# Patient Record
Sex: MALE | NOT HISPANIC OR LATINO | URBAN - METROPOLITAN AREA
[De-identification: names, ages, dates, MRNs, and addresses within clinical notes are randomized per-mention and may not be internally consistent; named-entity substitution may affect disease eponyms.]

---

## 2018-03-06 NOTE — PROCEDURE NOTE: CLINICAL
PROCEDURE NOTE: Removal of Corneal Epithelium OD. Diagnosis: Anterior Basement Membrane Corneal Dystrophy. Prior to treatment, the risks/benefits/alternatives were discussed. The patient wished to proceed with procedure. Corneal epithelium was removed. Patient tolerated procedure well. There were no complications. Post procedure instructions given. Yelena Castle PROCEDURE NOTE: Bandage Contact Lens OD. Diagnosis: Anterior Basement Membrane Corneal Dystrophy.

## 2018-03-06 NOTE — PATIENT DISCUSSION
Patient educated on Superficial Keratectomy is performed prior to IOL surgery, the patient will be scheduled for a 2-3 week FU/Decision For Surgery with surgeon to repeat AR, MR, GHAZALA, Orbscan, and Biometry. A final repeat AR, MR, Orbscan, and Biometry will be performed AM of Surgery to confirm stability of cornea.

## 2018-03-06 NOTE — PATIENT DISCUSSION
Written RX for Tylenol #3 with Codeine, disp. 10 tablets, q4hrs/prn, no refills. Patient educated about the possible ocular discomfort for the next few days and to use the pain medication as needed. Written RX for topical antibiotic, Vigamox, qid x 7 days.

## 2018-04-30 NOTE — PATIENT DISCUSSION
4 day PO: Patient is doing well post-operatively. The importance of post-op drop compliance was emphasized. Drop schedule reviewed with patient. Patient to call if any visual changes or concerns.

## 2018-05-24 NOTE — PATIENT DISCUSSION
Va sc should improve with time and patient will report if it decreases. Re-check refractive correction in 1 month.

## 2018-09-19 NOTE — PATIENT DISCUSSION
"co ""My right eye never came out as clear as my left after the surgery and was hoping for better distance.""."

## 2018-10-01 NOTE — PROCEDURE NOTE: CLINICAL
PROCEDURE NOTE: Punctal Plugs, Silicone #1 OD. Diagnosis: Dry Eye Syndrome. Anesthesia: Topical. Prep: Betadine Flush. Prior to treatment, the risks/benefits/alternatives were discussed. The patient wished to proceed with procedure. Permanent silicone plugs were inserted. Size/location of plugs inserted: *. Patient tolerated procedure well. There were no complications. Post procedure instructions given. RLL P. Plug  eagle va 0.5 No complications.

## 2018-10-31 NOTE — PROCEDURE NOTE: SURGICAL
<p>Prior to commencing surgery patient identification, surgical procedure, site, and side were confirmed by Dr. Adarsh Scherer. Following topical proparacaine anesthesia, the patient was positioned at the YAG laser, a contact lens coupled to the cornea with methylcellulose and an axial posterior capsulotomy performed without complication using 3.4 Mj x 22. Excess methylcellulose was washed from the eye, one drop of Alphagan was instilled and the patient returned to the holding area having tolerated the procedure well and without complication. </p><p>MRN:[id number]</p>

## 2018-10-31 NOTE — PATIENT DISCUSSION
If > -0.75 cyl persists after YAG OD, and pt still unhappy with [facility], OK to see [address] to schedule Epi-LASEK OD Goal: sandy

## 2021-02-09 NOTE — PATIENT DISCUSSION
If > -0.75 cyl persists after YAG OD, and pt still unhappy with [facility], OK to see [facility] to schedule Epi-LASEK OD Goal: sandy

## 2021-03-26 ENCOUNTER — IMPORTED ENCOUNTER (OUTPATIENT)
Dept: URBAN - METROPOLITAN AREA CLINIC 1 | Facility: CLINIC | Age: 81
End: 2021-03-26

## 2021-03-26 PROBLEM — H35.3124: Noted: 2021-03-26

## 2021-03-26 PROBLEM — H16.143: Noted: 2021-03-26

## 2021-03-26 PROBLEM — H35.3221: Noted: 2021-03-26

## 2021-03-26 PROBLEM — H04.123: Noted: 2021-03-26

## 2021-03-26 PROBLEM — Z96.1: Noted: 2021-03-26

## 2021-03-26 PROBLEM — H35.3111: Noted: 2021-03-26

## 2021-03-26 PROCEDURE — 99204 OFFICE O/P NEW MOD 45 MIN: CPT

## 2021-03-26 PROCEDURE — 92015 DETERMINE REFRACTIVE STATE: CPT

## 2021-05-19 NOTE — PROCEDURE NOTE: SURGICAL
<p>Prior to commencing surgery patient identification, surgical procedure, site, and side were confirmed by Dr. Chuck Monroe. Following topical proparacaine anesthesia, the patient was positioned at the YAG laser, a contact lens coupled to the cornea with methylcellulose and an axial posterior capsulotomy performed without complication using 2.8 Mj x 61. Excess methylcellulose was washed from the eye, one drop of Alphagan was instilled and the patient returned to the holding area having tolerated the procedure well and without complication. </p><p>MRN: [id number]</p><p>&nbsp;</p>

## 2022-02-09 NOTE — PATIENT DISCUSSION
If > -0.75 cyl persists after YAG OD, and pt still unhappy with [facility], OK to see [facility] to schedule Epi-LASEK OD Goal: sandy

## 2022-04-02 ASSESSMENT — TONOMETRY
OD_IOP_MMHG: 8
OS_IOP_MMHG: 18

## 2022-04-02 ASSESSMENT — VISUAL ACUITY
OD_CC: 20/30-2
OS_CC: 20/400

## 2023-08-23 NOTE — PATIENT DISCUSSION
Call immediately to report any decreased vision or visual distortion.
Healing well. continue antibiotics.
Monitor.
Patient understands there is an increased risk of corneal edema after cataract surgery.
Recommended artificial tears to use: 1 drop 4x a day in both eyes.
Recommended observation.
Retinal tear and detachment warning symptoms reviewed and patient instructed to call immediately if increasing floaters, flashes, or decreasing peripheral vision.
See #1&2.
See #1. Patient to return 2-3 weeks for follow up.
Otc Regimen: I recommended a broad spectrum sunscreen with a SPF of 30 or higher.  I explained that SPF 30 sunscreens block approximately 97 percent of the sun's harmful rays.  Sunscreens should be applied at least 15 minutes prior to expected sun exposure and then every 2 hours after that as long as sun exposure continues. If swimming or exercising sunscreen should be reapplied every 45 minutes to an hour after getting wet or sweating.  One ounce, or the equivalent of a shot glass full of sunscreen, is adequate to protect the skin not covered by a bathing suit. I also recommended a lip balm with a sunscreen as well. Sun protective clothing can be used in lieu of sunscreen but must be worn the entire time you are exposed to the sun's rays.
Detail Level: Zone
Action 1: Continue

## 2025-05-22 NOTE — PATIENT DISCUSSION
Add Margie 128 cassi qhs OU.
Call immediately to report any decreased vision or visual distortion.
Discussed future YAG LASER Capsulotomy with patient.
Discussed the risks/benefits of laser capsulotomy. Laser recommended. Patient elects to proceed.
If > -0.75 cyl persists after YAG OD, and pt still unhappy with [facility], OK to see [address] to schedule Epi-LASEK OD Goal: sandy
Margie-128 ointment QHS.
Monitor.
OK for YAG OD only per [facility].
Observe for capsular opacity.
Patient happy with [facility].
Rec PP RLL today.
Recommended artificial tears to use: 1 drop 4x a day in both eyes.
Recommended observation.
Retinal tear and detachment warning symptoms reviewed and patient instructed to call immediately if increasing floaters, flashes, or decreasing peripheral vision.
S/P debridement OU.
Malnutrition...